# Patient Record
(demographics unavailable — no encounter records)

---

## 2025-02-05 NOTE — HISTORY OF PRESENT ILLNESS
[Denies] : Denies [No] : No [Yes] : Yes [Declined] : Declined [Informed consent documented in EHR.] : Informed consent documented in EHR. [TB] : Tuberculosis screening [Hep acute panel] : Hepatitis acute panel [Total Hep B core AB] : total Hepatitis B Core antibody [Left upper extremity] : Left upper extremity [24g] : 24g [Start Time: ___] : Medication Start Time: [unfilled] [End Time: ___] : Medication End Time: [unfilled] [Medication Name: ___] : Medication Name: [unfilled] [Total Amount Administered: ___] : Total Amount Administered: [unfilled] [IV discontinued. Intact. No signs or symptoms of IV complications noted. Time: ___] : IV discontinued. Intact. No signs or symptoms of IV complications noted. Time: [unfilled] [Patient  instructed to seek medical attention with signs and symptoms of adverse effects] : Patient  instructed to seek medical attention with signs and symptoms of adverse effects [Patient left unit in no acute distress] : Patient left unit in no acute distress [Medications administered as ordered and tolerated well.] : Medications administered as ordered and tolerated well. [de-identified] : AC [de-identified] : [id number] [de-identified] : NEXT INFUSION - 3/5/25

## 2025-03-05 NOTE — HISTORY OF PRESENT ILLNESS
[Denies] : Denies [No] : No [Yes] : Yes [Declined] : Declined [Informed consent documented in EHR.] : Informed consent documented in EHR. [TB] : Tuberculosis screening [Hep acute panel] : Hepatitis acute panel [Total Hep B core AB] : total Hepatitis B Core antibody [Right upper extremity] : Right upper extremity [24g] : 24g [Start Time: ___] : Medication Start Time: [unfilled] [End Time: ___] : Medication End Time: [unfilled] [Medication Name: ___] : Medication Name: [unfilled] [Total Amount Administered: ___] : Total Amount Administered: [unfilled] [IV discontinued. Intact. No signs or symptoms of IV complications noted. Time: ___] : IV discontinued. Intact. No signs or symptoms of IV complications noted. Time: [unfilled] [Patient  instructed to seek medical attention with signs and symptoms of adverse effects] : Patient  instructed to seek medical attention with signs and symptoms of adverse effects [Patient left unit in no acute distress] : Patient left unit in no acute distress [Medications administered as ordered and tolerated well.] : Medications administered as ordered and tolerated well. [de-identified] : 11:18AM [de-identified] : NEXT INFUSION- 04/02/25 @ 11:00AM

## 2025-03-05 NOTE — HISTORY OF PRESENT ILLNESS
[de-identified] : 2018 EGD by Dr. Araujo allegedly showing gastritis; but no H. pylori.  No official report. [FreeTextEntry1] : Virtual colonoscopy performed 2018 via Dr. Araujo

## 2025-03-05 NOTE — PHYSICAL EXAM
[Alert] : alert [Normal Voice/Communication] : normal voice/communication [Healthy Appearing] : healthy appearing [No Acute Distress] : no acute distress [Sclera] : the sclera and conjunctiva were normal [Hearing Threshold Finger Rub Not [place]] : hearing was normal [Normal Lips/Gums] : the lips and gums were normal [Oropharynx] : the oropharynx was normal [Normal Appearance] : the appearance of the neck was normal [No Neck Mass] : no neck mass was observed [No Respiratory Distress] : no respiratory distress [No Acc Muscle Use] : no accessory muscle use [Respiration, Rhythm And Depth] : normal respiratory rhythm and effort [Auscultation Breath Sounds / Voice Sounds] : lungs were clear to auscultation bilaterally [Heart Rate And Rhythm] : heart rate was normal and rhythm regular [Normal S1, S2] : normal S1 and S2 [Murmurs] : no murmurs [None] : no edema [Bowel Sounds] : normal bowel sounds [Abdomen Tenderness] : non-tender [No Masses] : no abdominal mass palpated [Abdomen Soft] : soft [] : no hepatosplenomegaly [Oriented To Time, Place, And Person] : oriented to person, place, and time [de-identified] : Well-developed well-nourished medium frame and build.  No acute distress. [FreeTextEntry1] : Anicteric sclera. [de-identified] : Moist mucosa.  No pharyngitis.  No adenopathy.  Mallampati #2. [de-identified] : Neck supple.  No adenopathy. [de-identified] : Clear to auscultation. [de-identified] : No murmurs rubs or gallops. [de-identified] : No CCE. [de-identified] : Flat soft bowel sounds present.  No organomegaly.  No mass tenderness or rebound.  No scars. [de-identified] : Not performed today. [de-identified] : Intact. [de-identified] : Normal. [de-identified] : Normal. [de-identified] : Normal.

## 2025-03-05 NOTE — ASSESSMENT
[FreeTextEntry1] : History of painless gross hematochezia thought secondary to possible internal hemorrhoids.  No constipation issues.  No trauma.  For bleeding.  Bleeding has temporarily stopped.  A remote virtual colonoscopy done 7 years ago showed hemorrhoids. Family history is positive for 1 first-degree relative with colon polyps i.e. mother. Therefore patient is at increased risk for development of colorectal neoplasia.  As such a diagnostic colonoscopy was offered to the patient.  The procedure, its risk benefits and prep, were explained to the patient, who understands and is agreeable to proceed.  A #2.  Mallampati #1.  Blood work from 1/25 reviewed and all normal.  I.e. CBC, CMP.  Gavilyte prep to be employed. Repeat blood work not required.  No clearances required. Patient appears to be in optimal medical condition to undergo planned procedure.  Arrangements made.  Results to follow.

## 2025-03-08 NOTE — HISTORY OF PRESENT ILLNESS
[N] : Patient reports normal menses [Condoms] : uses condoms [Y] : Positive pregnancy history [Menarche Age: ____] : age at menarche was [unfilled] [No] : Patient does not have concerns regarding sex [PapSmeardate] : 3/2/24 [TextBox_31] : NEG [HIVDate] : 1/15/21 [TextBox_53] : NEG [SyphilisDate] : 1/15/21 [TextBox_58] : NEG [GonorrheaDate] : 3/2/24 [TextBox_63] : NEG [ChlamydiaDate] : 3/2/24 [TextBox_68] : NEG [TrichomonasDate] : 1/15/21 [TextBox_73] : NEG [HPVDate] : 1/15/21 [TextBox_78] : NEG [HepatitisBDate] : 10/13/21 [TextBox_83] : NEG [HepatitisCDate] : 10/13/21 [TextBox_88] : NEG [LMPDate] : 3/3/25 [PGHxTotal] : 3 [[facility]] : 1 [[facility]] : 1 [PGHxABSpont] : 2 [FreeTextEntry1] : 3/3/25

## 2025-03-08 NOTE — HISTORY OF PRESENT ILLNESS
[N] : Patient reports normal menses [Condoms] : uses condoms [Y] : Positive pregnancy history [Menarche Age: ____] : age at menarche was [unfilled] [No] : Patient does not have concerns regarding sex [PapSmeardate] : 3/2/24 [TextBox_31] : NEG [HIVDate] : 1/15/21 [TextBox_53] : NEG [SyphilisDate] : 1/15/21 [TextBox_58] : NEG [GonorrheaDate] : 3/2/24 [TextBox_63] : NEG [ChlamydiaDate] : 3/2/24 [TextBox_68] : NEG [TrichomonasDate] : 1/15/21 [TextBox_73] : NEG [HPVDate] : 1/15/21 [HepatitisBDate] : 10/13/21 [TextBox_78] : NEG [TextBox_83] : NEG [HepatitisCDate] : 10/13/21 [TextBox_88] : NEG [LMPDate] : 3/3/25 [PGHxTotal] : 3 [[facility]] : 1 [[facility]] : 1 [PGHxABSpont] : 2 [FreeTextEntry1] : 3/3/25

## 2025-03-08 NOTE — PLAN
[FreeTextEntry1] : fu pelvic sono after menses check endo stripe consider Mirena /Kyleena- pt will think it over- hesitant Hysteroscopy advised if heavy menses continues for endometrial sampling discussed pt verbalized good understanding

## 2025-04-02 NOTE — PLAN
[FreeTextEntry1] : [facility] 160 at PMD office. Will repeat today. Plan for ETOP- to schedule appt with AS Discussed contraception, pt scheduled for IUD placement.

## 2025-04-02 NOTE — HISTORY OF PRESENT ILLNESS
[N] : Patient reports normal menses [Condoms] : uses condoms [Y] : Positive pregnancy history [Menarche Age: ____] : age at menarche was [unfilled] [No] : Patient does not have concerns regarding sex [Currently Active] : currently active [Men] : men [TextBox_4] : PT with +UCG On Rheumatoid Arthritis medications Not interested in pregnancy, was planning to have IUD [Papeardate] : 03/08/25 [TextBox_31] : NEG [HIVDate] : 01/15/21 [TextBox_53] : NEG [SyphilisDate] : 01/15/21 [TextBox_58] : NEG [GonorrheaDate] : 03/08/25 [TextBox_63] : NEG [ChlamydiaDate] : 03/08/25 [TextBox_68] : NEG [TrichomonasDate] : 01/15/21 [TextBox_73] : NEG [HPVDate] : 01/15/21 [TextBox_78] : NEG [LMPDate] : 03/03/25 [PGHxTotal] : 3 [[facility]xF[facility]] : 1 [[facility]] : 1 [PGHxABSpont] : 2 [FreeTextEntry1] : 03/03/25

## 2025-04-09 NOTE — HISTORY OF PRESENT ILLNESS
[Menarche Age: ____] : age at menarche was [unfilled] [Y] : Patient uses contraception [Condoms] : uses condoms [PapSmeardate] : 3/8/25 [TextBox_31] : NEG [GonorrheaDate] : 3/8/25 [TextBox_63] : NEG [ChlamydiaDate] : 3/8/25 [TextBox_68] : NEG [LMPDate] : 3/3/25 [PGHxTotal] : 3 [[facility]xFul[facility]] : 1 [[facility] Hospitaliving] : 1 [PGHxABSpont] : 2 [FreeTextEntry1] : 3/3/25

## 2025-04-09 NOTE — HISTORY OF PRESENT ILLNESS
[Menarche Age: ____] : age at menarche was [unfilled] [Y] : Patient uses contraception [Condoms] : uses condoms [PapSmeardate] : 3/8/25 [TextBox_31] : NEG [GonorrheaDate] : 3/8/25 [TextBox_63] : NEG [ChlamydiaDate] : 3/8/25 [TextBox_68] : NEG [LMPDate] : 3/3/25 [PGHxTotal] : 3 [[facility]xFul[facility]] : 1 [[facility] Centeriving] : 1 [PGHxABSpont] : 2 [FreeTextEntry1] : 3/3/25

## 2025-04-21 NOTE — PLAN
[FreeTextEntry1] : FU HCG and discussion of results and plan possible need for D&C for retained products of conception S/P MAB for an unwanted pregnancy  Risks outlined Importance of fu reviewed track placed for fu

## 2025-04-21 NOTE — HISTORY OF PRESENT ILLNESS
[Menarche Age: ____] : age at menarche was [unfilled] [N] : Patient does not use contraception [Y] : Patient is sexually active [Currently Active] : currently active [Men] : men [No] : No [PapSmeardate] : 03/08/2025 [TextBox_31] : neg  [ColonoscopyDate] : n/a [TextBox_43] : appt on 04/24/2025 [HIVDate] : 01/15/2021 [TextBox_53] : neg  [SyphilisDate] : 01/15/2021 [TextBox_58] : neg  [GonorrheaDate] : 03/08/2025 [TextBox_63] : neg  [ChlamydiaDate] : 03/08/2025 [TextBox_68] : neg  [TrichomonasDate] : 01/15/2021 [TextBox_73] : neg  [HPVDate] : 01/15/2021 [TextBox_78] : neg  [LMPDate] : 03/07/2025 [PGHxTotal] : 4 [[facility]xFul[facility]] : 1 [[facility]] : 1 [PGxABSpont] : 3 [FreeTextEntry1] : 03/07/2025 [FreeTextEntry2] :

## 2025-04-22 NOTE — PLAN
[FreeTextEntry1] : RTC in 2 wks for repeat U/S Pt advised to consider with IUD she would like for insertion in the near future Advised to use condoms

## 2025-04-22 NOTE — HISTORY OF PRESENT ILLNESS
[N] : Patient does not use contraception [Y] : Positive pregnancy history [Menarche Age: ____] : age at menarche was [unfilled] [No] : Patient does not have concerns regarding sex [Papeardate] : 03/08/25 [TextBox_31] : NEG [HPVDate] : 01/15/21 [TextBox_78] : NEG [LMPDate] : 03/07/25 [PGHxTotal] : 4 [[facility] CenterxFulerm] : 1 [[facility]iving] : 1 [PGHxABInduced] : 3 [FreeTextEntry1] : 03/07/25